# Patient Record
Sex: FEMALE | Race: WHITE | NOT HISPANIC OR LATINO | Employment: FULL TIME | URBAN - METROPOLITAN AREA
[De-identification: names, ages, dates, MRNs, and addresses within clinical notes are randomized per-mention and may not be internally consistent; named-entity substitution may affect disease eponyms.]

---

## 2023-03-24 LAB
EXTERNAL HEPATITIS B SURFACE ANTIGEN: NEGATIVE
EXTERNAL HEPATITIS C AB: NONREACTIVE
EXTERNAL RUBELLA QUALITATIVE: NORMAL
EXTERNAL SYPHILIS RPR SCREEN: NORMAL
HIV1 P24 AG SERPL QL IA: NEGATIVE

## 2023-10-01 ENCOUNTER — HOSPITAL ENCOUNTER (EMERGENCY)
Facility: HOSPITAL | Age: 28
Discharge: HOME | End: 2023-10-01
Attending: OBSTETRICS & GYNECOLOGY | Admitting: OBSTETRICS & GYNECOLOGY
Payer: COMMERCIAL

## 2023-10-01 VITALS
RESPIRATION RATE: 16 BRPM | HEART RATE: 74 BPM | DIASTOLIC BLOOD PRESSURE: 88 MMHG | TEMPERATURE: 98.2 F | OXYGEN SATURATION: 99 % | WEIGHT: 255 LBS | SYSTOLIC BLOOD PRESSURE: 125 MMHG | BODY MASS INDEX: 39.94 KG/M2

## 2023-10-01 LAB
ABO GROUP BLD: NORMAL
ALBUMIN SERPL-MCNC: 3.3 G/DL (ref 3.5–5.2)
ALBUMIN/GLOB SERPL: 1.1 G/DL
ALP SERPL-CCNC: 155 U/L (ref 39–117)
ALT SERPL W P-5'-P-CCNC: 25 U/L (ref 1–33)
ANION GAP SERPL CALCULATED.3IONS-SCNC: 11 MMOL/L (ref 5–15)
AST SERPL-CCNC: 33 U/L (ref 1–32)
BACTERIA UR QL AUTO: ABNORMAL /HPF
BASOPHILS # BLD AUTO: 0.04 10*3/MM3 (ref 0–0.2)
BASOPHILS NFR BLD AUTO: 0.4 % (ref 0–1.5)
BILIRUB SERPL-MCNC: 0.5 MG/DL (ref 0–1.2)
BILIRUB UR QL STRIP: NEGATIVE
BLD GP AB SCN SERPL QL: NEGATIVE
BUN SERPL-MCNC: 8 MG/DL (ref 6–20)
BUN/CREAT SERPL: 12.3 (ref 7–25)
CALCIUM SPEC-SCNC: 9.1 MG/DL (ref 8.6–10.5)
CHLORIDE SERPL-SCNC: 106 MMOL/L (ref 98–107)
CLARITY UR: CLEAR
CO2 SERPL-SCNC: 19 MMOL/L (ref 22–29)
COLOR UR: YELLOW
CREAT SERPL-MCNC: 0.65 MG/DL (ref 0.57–1)
CREAT UR-MCNC: 177 MG/DL
DEPRECATED RDW RBC AUTO: 39.2 FL (ref 37–54)
EGFRCR SERPLBLD CKD-EPI 2021: 121.6 ML/MIN/1.73
EOSINOPHIL # BLD AUTO: 0.1 10*3/MM3 (ref 0–0.4)
EOSINOPHIL NFR BLD AUTO: 1 % (ref 0.3–6.2)
ERYTHROCYTE [DISTWIDTH] IN BLOOD BY AUTOMATED COUNT: 13.1 % (ref 12.3–15.4)
GLOBULIN UR ELPH-MCNC: 2.9 GM/DL
GLUCOSE SERPL-MCNC: 98 MG/DL (ref 65–99)
GLUCOSE UR STRIP-MCNC: NEGATIVE MG/DL
HCT VFR BLD AUTO: 34.5 % (ref 34–46.6)
HGB BLD-MCNC: 11.6 G/DL (ref 12–15.9)
HGB UR QL STRIP.AUTO: NEGATIVE
HYALINE CASTS UR QL AUTO: ABNORMAL /LPF
IMM GRANULOCYTES # BLD AUTO: 0.02 10*3/MM3 (ref 0–0.05)
IMM GRANULOCYTES NFR BLD AUTO: 0.2 % (ref 0–0.5)
KETONES UR QL STRIP: NEGATIVE
LEUKOCYTE ESTERASE UR QL STRIP.AUTO: ABNORMAL
LYMPHOCYTES # BLD AUTO: 2.3 10*3/MM3 (ref 0.7–3.1)
LYMPHOCYTES NFR BLD AUTO: 23.6 % (ref 19.6–45.3)
MCH RBC QN AUTO: 28 PG (ref 26.6–33)
MCHC RBC AUTO-ENTMCNC: 33.6 G/DL (ref 31.5–35.7)
MCV RBC AUTO: 83.1 FL (ref 79–97)
MONOCYTES # BLD AUTO: 1.02 10*3/MM3 (ref 0.1–0.9)
MONOCYTES NFR BLD AUTO: 10.5 % (ref 5–12)
NEUTROPHILS NFR BLD AUTO: 6.26 10*3/MM3 (ref 1.7–7)
NEUTROPHILS NFR BLD AUTO: 64.3 % (ref 42.7–76)
NITRITE UR QL STRIP: NEGATIVE
NRBC BLD AUTO-RTO: 0 /100 WBC (ref 0–0.2)
PH UR STRIP.AUTO: 5.5 [PH] (ref 5–8)
PLATELET # BLD AUTO: 294 10*3/MM3 (ref 140–450)
PMV BLD AUTO: 11.4 FL (ref 6–12)
POTASSIUM SERPL-SCNC: 4 MMOL/L (ref 3.5–5.2)
PROT ?TM UR-MCNC: 16.6 MG/DL
PROT SERPL-MCNC: 6.2 G/DL (ref 6–8.5)
PROT UR QL STRIP: NEGATIVE
PROT/CREAT UR: 93.8 MG/G CREA (ref 0–200)
RBC # BLD AUTO: 4.15 10*6/MM3 (ref 3.77–5.28)
RBC # UR STRIP: ABNORMAL /HPF
REF LAB TEST METHOD: ABNORMAL
RH BLD: POSITIVE
SODIUM SERPL-SCNC: 136 MMOL/L (ref 136–145)
SP GR UR STRIP: 1.02 (ref 1–1.03)
SQUAMOUS #/AREA URNS HPF: ABNORMAL /HPF
T&S EXPIRATION DATE: NORMAL
UROBILINOGEN UR QL STRIP: ABNORMAL
WBC # UR STRIP: ABNORMAL /HPF
WBC NRBC COR # BLD: 9.74 10*3/MM3 (ref 3.4–10.8)

## 2023-10-01 PROCEDURE — 82570 ASSAY OF URINE CREATININE: CPT | Performed by: OBSTETRICS & GYNECOLOGY

## 2023-10-01 PROCEDURE — 86901 BLOOD TYPING SEROLOGIC RH(D): CPT | Performed by: OBSTETRICS & GYNECOLOGY

## 2023-10-01 PROCEDURE — 80053 COMPREHEN METABOLIC PANEL: CPT | Performed by: OBSTETRICS & GYNECOLOGY

## 2023-10-01 PROCEDURE — 87086 URINE CULTURE/COLONY COUNT: CPT | Performed by: OBSTETRICS & GYNECOLOGY

## 2023-10-01 PROCEDURE — 86850 RBC ANTIBODY SCREEN: CPT | Performed by: OBSTETRICS & GYNECOLOGY

## 2023-10-01 PROCEDURE — 59025 FETAL NON-STRESS TEST: CPT

## 2023-10-01 PROCEDURE — 84156 ASSAY OF PROTEIN URINE: CPT | Performed by: OBSTETRICS & GYNECOLOGY

## 2023-10-01 PROCEDURE — 81001 URINALYSIS AUTO W/SCOPE: CPT | Performed by: OBSTETRICS & GYNECOLOGY

## 2023-10-01 PROCEDURE — 85025 COMPLETE CBC W/AUTO DIFF WBC: CPT | Performed by: OBSTETRICS & GYNECOLOGY

## 2023-10-01 PROCEDURE — 99283 EMERGENCY DEPT VISIT LOW MDM: CPT | Performed by: OBSTETRICS & GYNECOLOGY

## 2023-10-01 PROCEDURE — 86900 BLOOD TYPING SEROLOGIC ABO: CPT | Performed by: OBSTETRICS & GYNECOLOGY

## 2023-10-01 RX ORDER — PRENATAL VIT NO.126/IRON/FOLIC 28MG-0.8MG
TABLET ORAL DAILY
COMMUNITY

## 2023-10-01 NOTE — DISCHARGE INSTRUCTIONS
Continue to monitor your BP at home.    Return to L&D for frequent painful contractions, leaking fluid, vaginal bleeding, decreased fetal movement, severe headache, vision changes, abdominal pain, significant swelling    Bedrest    F/u Monday or Tuesday with Dr Ramírez

## 2023-10-01 NOTE — OBED NOTES
GENIE Note OB    Patient Name: Berna Knight  YOB: 1995  MRN: [id number]  Admission Date: 10/1/2023 12:05 PM  Date of Service: 10/1/2023    Chief Complaint:Pt reports BP high        Subjective     Berna Knight is a 28 y.o. female  at Unknown with Estimated Date of Delivery: None noted. who presents with the chief complaint listed above. Pt reports her BP usually 120/80s. 3 days ago in OB office her BP was 140/101. She has been checking her BP thru out pregnancy as she has a history of preeclampsia necessitating delivery at 36 weeks. Her highest BP she got at home was 166/108. She denies HA, visual changes, or RUQ pain. No unusual swelling     She sees Evan Ramírez MD for her prenatal care. Her pregnancy has been complicated by:  hx of prior CS, preeclampsia, obese    She describes fetal movement as normal.  She denies rupture of membranes.  She denies vaginal bleeding. She is not feeling contractions.        Objective   There are no problems to display for this patient.       OB History    Para Term  AB Living   1 0 0 0 0 0   SAB IAB Ectopic Molar Multiple Live Births   0 0 0 0 0 0      # Outcome Date GA Lbr Xiang/2nd Weight Sex Delivery Anes PTL Lv   1 Current                 Past Medical History:   Diagnosis Date    Asthma        Past Surgical History:   Procedure Laterality Date     SECTION      TONSILLECTOMY      WISDOM TOOTH EXTRACTION         No current facility-administered medications on file prior to encounter.     Current Outpatient Medications on File Prior to Encounter   Medication Sig Dispense Refill    levonorgestrel (MIRENA) 20 MCG/24HR IUD 1 each by Intrauterine route.      terbinafine (LAMISIL AT) 1 % cream Apply  topically 2 (Two) Times a Day. 15 g 1       Allergies   Allergen Reactions    Augmentin [Amoxicillin-Pot Clavulanate]     Cephalosporins     Latex     Penicillins        Family History   Problem Relation Age of Onset    Diabetes  Mother         type 2 controlled by diet       Social History     Socioeconomic History    Marital status: Single   Tobacco Use    Smoking status: Never    Smokeless tobacco: Never   Substance and Sexual Activity    Alcohol use: No    Drug use: No    Sexual activity: Defer           Review of Systems   Constitutional:  Negative for chills and fever.   HENT: Negative.     Eyes:  Negative for photophobia and visual disturbance.   Respiratory:  Negative for shortness of breath.    Cardiovascular:  Negative for chest pain.   Gastrointestinal:  Negative for nausea.   Psychiatric/Behavioral:  The patient is not nervous/anxious.         PHYSICAL EXAM:      VITAL SIGNS:  There were no vitals filed for this visit.         FHT'S:    150 with moderate variability and accels                                     PHYSICAL EXAM:      General: well developed; well nourished  no acute distress   Heart: Not performed.   Lungs   breathing is unlabored   Abdomen: Gravid and non tender     Extremities: trace edema, DTRs 1 plus, no clonus       Cervix: was not checked.        Contractions:   none                    LABS AND TESTING ORDERED:  Uterine and fetal monitoring  Urinalysis  CBC, CMP, urine P:C    LAB RESULTS:    No results found for this or any previous visit (from the past 24 hour(s)).    No results found for: ABO, RH    No results found for: STREPGPB                    Impression:   @36 week, 2/7 days  Final Diagnosis: GHTN, RO preeclampsia, BP mildly elevated , labs normal including urine P:C of 93    Plan:  1. Discharge home  2. Plan of care has been reviewed with patient along with risks, benefits of treatment.   All questions have been answered. Call health care provider for any further concerns and keep office appointments.  3. Bed rest with tox precautions, keep appt for early this week, labor precautions      I discussed the patients findings and my recommendations with patient, family, and nursing staff      Eloina BRICEÑO  MD Krzysztof  10/1/2023  12:35 EDT

## 2023-10-01 NOTE — NURSING NOTE
Discharge instructions reviewed with patient. Encouraged bedrest. Instructed patient to return to L&D for symptoms of labor or pre-eclampsia.  Questions answered. DC home.

## 2023-10-02 LAB — BACTERIA SPEC AEROBE CULT: NO GROWTH

## 2023-10-03 LAB — EXTERNAL GROUP B STREP ANTIGEN: NORMAL

## 2023-10-12 ENCOUNTER — ANESTHESIA (OUTPATIENT)
Dept: LABOR AND DELIVERY | Facility: HOSPITAL | Age: 28
End: 2023-10-12
Payer: COMMERCIAL

## 2023-10-12 ENCOUNTER — HOSPITAL ENCOUNTER (INPATIENT)
Facility: HOSPITAL | Age: 28
LOS: 3 days | Discharge: HOME | End: 2023-10-15
Attending: OBSTETRICS & GYNECOLOGY | Admitting: OBSTETRICS & GYNECOLOGY
Payer: COMMERCIAL

## 2023-10-12 ENCOUNTER — ANESTHESIA EVENT (OUTPATIENT)
Dept: LABOR AND DELIVERY | Facility: HOSPITAL | Age: 28
End: 2023-10-12
Payer: COMMERCIAL

## 2023-10-12 PROBLEM — O13.3 PREGNANCY-INDUCED HYPERTENSION, THIRD TRIMESTER: Status: ACTIVE | Noted: 2023-10-12

## 2023-10-12 PROBLEM — O34.219 HISTORY OF CESAREAN SECTION COMPLICATING PREGNANCY: Status: ACTIVE | Noted: 2023-10-12

## 2023-10-12 PROBLEM — Z34.90 PREGNANCY: Status: ACTIVE | Noted: 2023-10-12

## 2023-10-12 LAB
ABO GROUP BLD: NORMAL
ALBUMIN SERPL-MCNC: 3.2 G/DL (ref 3.5–5.2)
ALBUMIN/GLOB SERPL: 1 G/DL
ALP SERPL-CCNC: 164 U/L (ref 39–117)
ALT SERPL W P-5'-P-CCNC: 40 U/L (ref 1–33)
ANION GAP SERPL CALCULATED.3IONS-SCNC: 12.7 MMOL/L (ref 5–15)
AST SERPL-CCNC: 25 U/L (ref 1–32)
ATMOSPHERIC PRESS: 745.1 MMHG
BASE EXCESS BLDCOA CALC-SCNC: -4.4 MMOL/L (ref -2–2)
BASOPHILS # BLD AUTO: 0.03 10*3/MM3 (ref 0–0.2)
BASOPHILS NFR BLD AUTO: 0.3 % (ref 0–1.5)
BDY SITE: ABNORMAL
BILIRUB SERPL-MCNC: 0.4 MG/DL (ref 0–1.2)
BLD GP AB SCN SERPL QL: NEGATIVE
BUN SERPL-MCNC: 8 MG/DL (ref 6–20)
BUN/CREAT SERPL: 10.7 (ref 7–25)
CALCIUM SPEC-SCNC: 8.9 MG/DL (ref 8.6–10.5)
CHLORIDE SERPL-SCNC: 104 MMOL/L (ref 98–107)
CO2 BLDA-SCNC: 26.8 MMOL/L (ref 23–27)
CO2 SERPL-SCNC: 18.3 MMOL/L (ref 22–29)
CREAT SERPL-MCNC: 0.75 MG/DL (ref 0.57–1)
DEPRECATED RDW RBC AUTO: 41.7 FL (ref 37–54)
DEVICE COMMENT: ABNORMAL
EGFRCR SERPLBLD CKD-EPI 2021: 110 ML/MIN/1.73
EOSINOPHIL # BLD AUTO: 0.13 10*3/MM3 (ref 0–0.4)
EOSINOPHIL NFR BLD AUTO: 1.3 % (ref 0.3–6.2)
ERYTHROCYTE [DISTWIDTH] IN BLOOD BY AUTOMATED COUNT: 13.5 % (ref 12.3–15.4)
GLOBULIN UR ELPH-MCNC: 3.3 GM/DL
GLUCOSE SERPL-MCNC: 115 MG/DL (ref 65–99)
HCO3 BLDCOA-SCNC: 25 MMOL/L (ref 22–28)
HCT VFR BLD AUTO: 36.9 % (ref 34–46.6)
HGB BLD-MCNC: 12.3 G/DL (ref 12–15.9)
IMM GRANULOCYTES # BLD AUTO: 0.03 10*3/MM3 (ref 0–0.05)
IMM GRANULOCYTES NFR BLD AUTO: 0.3 % (ref 0–0.5)
LYMPHOCYTES # BLD AUTO: 3.05 10*3/MM3 (ref 0.7–3.1)
LYMPHOCYTES NFR BLD AUTO: 30.1 % (ref 19.6–45.3)
MCH RBC QN AUTO: 28.3 PG (ref 26.6–33)
MCHC RBC AUTO-ENTMCNC: 33.3 G/DL (ref 31.5–35.7)
MCV RBC AUTO: 84.8 FL (ref 79–97)
MODALITY: ABNORMAL
MONOCYTES # BLD AUTO: 0.68 10*3/MM3 (ref 0.1–0.9)
MONOCYTES NFR BLD AUTO: 6.7 % (ref 5–12)
NEUTROPHILS NFR BLD AUTO: 6.22 10*3/MM3 (ref 1.7–7)
NEUTROPHILS NFR BLD AUTO: 61.3 % (ref 42.7–76)
NRBC BLD AUTO-RTO: 0 /100 WBC (ref 0–0.2)
PCO2 BLDCOA: 60.4 MMHG (ref 43–63)
PH BLDCOA: 7.22 PH UNITS (ref 7.18–7.34)
PLATELET # BLD AUTO: 312 10*3/MM3 (ref 140–450)
PMV BLD AUTO: 12.2 FL (ref 6–12)
PO2 BLDCOA: <18.1 MMHG (ref 12–26)
POTASSIUM SERPL-SCNC: 4 MMOL/L (ref 3.5–5.2)
PROT SERPL-MCNC: 6.5 G/DL (ref 6–8.5)
RBC # BLD AUTO: 4.35 10*6/MM3 (ref 3.77–5.28)
RH BLD: POSITIVE
SAO2 % BLDCOA: 18.9 %
SODIUM SERPL-SCNC: 135 MMOL/L (ref 136–145)
T&S EXPIRATION DATE: NORMAL
WBC NRBC COR # BLD: 10.14 10*3/MM3 (ref 3.4–10.8)

## 2023-10-12 PROCEDURE — 25810000003 LACTATED RINGERS PER 1000 ML: Performed by: OBSTETRICS & GYNECOLOGY

## 2023-10-12 PROCEDURE — 25010000002 ONDANSETRON PER 1 MG: Performed by: OBSTETRICS & GYNECOLOGY

## 2023-10-12 PROCEDURE — 25010000002 KETOROLAC TROMETHAMINE PER 15 MG: Performed by: OBSTETRICS & GYNECOLOGY

## 2023-10-12 PROCEDURE — 88307 TISSUE EXAM BY PATHOLOGIST: CPT

## 2023-10-12 PROCEDURE — 86901 BLOOD TYPING SEROLOGIC RH(D): CPT | Performed by: OBSTETRICS & GYNECOLOGY

## 2023-10-12 PROCEDURE — 86900 BLOOD TYPING SEROLOGIC ABO: CPT | Performed by: OBSTETRICS & GYNECOLOGY

## 2023-10-12 PROCEDURE — 25010000002 KETOROLAC TROMETHAMINE PER 15 MG: Performed by: NURSE ANESTHETIST, CERTIFIED REGISTERED

## 2023-10-12 PROCEDURE — 25010000002 DROPERIDOL PER 5 MG: Performed by: OBSTETRICS & GYNECOLOGY

## 2023-10-12 PROCEDURE — 25010000002 BUPIVACAINE PF 0.75 % SOLUTION: Performed by: ANESTHESIOLOGY

## 2023-10-12 PROCEDURE — 82803 BLOOD GASES ANY COMBINATION: CPT

## 2023-10-12 PROCEDURE — 25810000003 LACTATED RINGERS SOLUTION: Performed by: OBSTETRICS & GYNECOLOGY

## 2023-10-12 PROCEDURE — 25010000002 EPINEPHRINE 1 MG/ML SOLUTION 30 ML VIAL: Performed by: OBSTETRICS & GYNECOLOGY

## 2023-10-12 PROCEDURE — 25010000002 CLONIDINE PER 1 MG: Performed by: OBSTETRICS & GYNECOLOGY

## 2023-10-12 PROCEDURE — 86850 RBC ANTIBODY SCREEN: CPT | Performed by: OBSTETRICS & GYNECOLOGY

## 2023-10-12 PROCEDURE — 85025 COMPLETE CBC W/AUTO DIFF WBC: CPT | Performed by: OBSTETRICS & GYNECOLOGY

## 2023-10-12 PROCEDURE — 80053 COMPREHEN METABOLIC PANEL: CPT | Performed by: OBSTETRICS & GYNECOLOGY

## 2023-10-12 PROCEDURE — 25010000002 PHENYLEPHRINE 10 MG/ML SOLUTION: Performed by: NURSE ANESTHETIST, CERTIFIED REGISTERED

## 2023-10-12 PROCEDURE — 25010000002 CLINDAMYCIN 900 MG/50ML SOLUTION: Performed by: OBSTETRICS & GYNECOLOGY

## 2023-10-12 PROCEDURE — 25010000002 MORPHINE PER 10 MG: Performed by: ANESTHESIOLOGY

## 2023-10-12 PROCEDURE — 25010000002 ROPIVACAINE PER 1 MG: Performed by: OBSTETRICS & GYNECOLOGY

## 2023-10-12 PROCEDURE — 25010000002 GENTAMICIN PER 80 MG: Performed by: OBSTETRICS & GYNECOLOGY

## 2023-10-12 RX ORDER — PHYTONADIONE 1 MG/.5ML
INJECTION, EMULSION INTRAMUSCULAR; INTRAVENOUS; SUBCUTANEOUS
Status: ACTIVE
Start: 2023-10-12 | End: 2023-10-13

## 2023-10-12 RX ORDER — ENOXAPARIN SODIUM 100 MG/ML
40 INJECTION SUBCUTANEOUS EVERY 12 HOURS
Status: DISCONTINUED | OUTPATIENT
Start: 2023-10-13 | End: 2023-10-15 | Stop reason: HOSPADM

## 2023-10-12 RX ORDER — OXYTOCIN/0.9 % SODIUM CHLORIDE 30/500 ML
125 PLASTIC BAG, INJECTION (ML) INTRAVENOUS CONTINUOUS PRN
Status: DISCONTINUED | OUTPATIENT
Start: 2023-10-12 | End: 2023-10-15 | Stop reason: HOSPADM

## 2023-10-12 RX ORDER — KETOROLAC TROMETHAMINE 30 MG/ML
30 INJECTION, SOLUTION INTRAMUSCULAR; INTRAVENOUS ONCE
Status: DISCONTINUED | OUTPATIENT
Start: 2023-10-12 | End: 2023-10-12

## 2023-10-12 RX ORDER — MISOPROSTOL 200 UG/1
800 TABLET ORAL ONCE AS NEEDED
Status: DISCONTINUED | OUTPATIENT
Start: 2023-10-12 | End: 2023-10-12

## 2023-10-12 RX ORDER — HYDROXYZINE 50 MG/1
50 TABLET, FILM COATED ORAL EVERY 6 HOURS PRN
Status: DISCONTINUED | OUTPATIENT
Start: 2023-10-12 | End: 2023-10-15 | Stop reason: HOSPADM

## 2023-10-12 RX ORDER — MORPHINE SULFATE 2 MG/ML
2 INJECTION, SOLUTION INTRAMUSCULAR; INTRAVENOUS
Status: ACTIVE | OUTPATIENT
Start: 2023-10-12 | End: 2023-10-12

## 2023-10-12 RX ORDER — KETOROLAC TROMETHAMINE 30 MG/ML
INJECTION, SOLUTION INTRAMUSCULAR; INTRAVENOUS AS NEEDED
Status: DISCONTINUED | OUTPATIENT
Start: 2023-10-12 | End: 2023-10-12 | Stop reason: SURG

## 2023-10-12 RX ORDER — DROPERIDOL 2.5 MG/ML
0.62 INJECTION, SOLUTION INTRAMUSCULAR; INTRAVENOUS
Status: COMPLETED | OUTPATIENT
Start: 2023-10-12 | End: 2023-10-12

## 2023-10-12 RX ORDER — OXYCODONE HYDROCHLORIDE 10 MG/1
10 TABLET ORAL EVERY 4 HOURS PRN
Status: DISCONTINUED | OUTPATIENT
Start: 2023-10-12 | End: 2023-10-15 | Stop reason: HOSPADM

## 2023-10-12 RX ORDER — SCOLOPAMINE TRANSDERMAL SYSTEM 1 MG/1
1 PATCH, EXTENDED RELEASE TRANSDERMAL
Status: DISCONTINUED | OUTPATIENT
Start: 2023-10-12 | End: 2023-10-15 | Stop reason: HOSPADM

## 2023-10-12 RX ORDER — DOCUSATE SODIUM 100 MG/1
100 CAPSULE, LIQUID FILLED ORAL 2 TIMES DAILY PRN
Status: DISCONTINUED | OUTPATIENT
Start: 2023-10-12 | End: 2023-10-15 | Stop reason: HOSPADM

## 2023-10-12 RX ORDER — SIMETHICONE 80 MG
80 TABLET,CHEWABLE ORAL 4 TIMES DAILY PRN
Status: DISCONTINUED | OUTPATIENT
Start: 2023-10-12 | End: 2023-10-15 | Stop reason: HOSPADM

## 2023-10-12 RX ORDER — PROMETHAZINE HYDROCHLORIDE 25 MG/1
25 TABLET ORAL EVERY 6 HOURS PRN
Status: DISCONTINUED | OUTPATIENT
Start: 2023-10-12 | End: 2023-10-15 | Stop reason: HOSPADM

## 2023-10-12 RX ORDER — OXYTOCIN/0.9 % SODIUM CHLORIDE 30/500 ML
999 PLASTIC BAG, INJECTION (ML) INTRAVENOUS ONCE
Status: COMPLETED | OUTPATIENT
Start: 2023-10-12 | End: 2023-10-12

## 2023-10-12 RX ORDER — HYDROCORTISONE 25 MG/G
CREAM TOPICAL 3 TIMES DAILY PRN
Status: DISCONTINUED | OUTPATIENT
Start: 2023-10-12 | End: 2023-10-15 | Stop reason: HOSPADM

## 2023-10-12 RX ORDER — OXYCODONE HYDROCHLORIDE 5 MG/1
5 TABLET ORAL EVERY 4 HOURS PRN
Status: DISCONTINUED | OUTPATIENT
Start: 2023-10-12 | End: 2023-10-15 | Stop reason: HOSPADM

## 2023-10-12 RX ORDER — TRANEXAMIC ACID 10 MG/ML
1000 INJECTION, SOLUTION INTRAVENOUS ONCE AS NEEDED
Status: DISCONTINUED | OUTPATIENT
Start: 2023-10-12 | End: 2023-10-12

## 2023-10-12 RX ORDER — SODIUM CHLORIDE, SODIUM LACTATE, POTASSIUM CHLORIDE, CALCIUM CHLORIDE 600; 310; 30; 20 MG/100ML; MG/100ML; MG/100ML; MG/100ML
125 INJECTION, SOLUTION INTRAVENOUS CONTINUOUS
Status: DISCONTINUED | OUTPATIENT
Start: 2023-10-12 | End: 2023-10-12

## 2023-10-12 RX ORDER — SODIUM CHLORIDE 9 MG/ML
40 INJECTION, SOLUTION INTRAVENOUS AS NEEDED
Status: DISCONTINUED | OUTPATIENT
Start: 2023-10-12 | End: 2023-10-15 | Stop reason: HOSPADM

## 2023-10-12 RX ORDER — FAMOTIDINE 10 MG/ML
20 INJECTION, SOLUTION INTRAVENOUS ONCE AS NEEDED
Status: COMPLETED | OUTPATIENT
Start: 2023-10-12 | End: 2023-10-12

## 2023-10-12 RX ORDER — ACETAMINOPHEN 500 MG
1000 TABLET ORAL EVERY 6 HOURS
Status: COMPLETED | OUTPATIENT
Start: 2023-10-12 | End: 2023-10-13

## 2023-10-12 RX ORDER — ONDANSETRON 4 MG/1
4 TABLET, FILM COATED ORAL EVERY 8 HOURS PRN
Status: DISCONTINUED | OUTPATIENT
Start: 2023-10-12 | End: 2023-10-15 | Stop reason: HOSPADM

## 2023-10-12 RX ORDER — ACETAMINOPHEN 325 MG/1
650 TABLET ORAL EVERY 6 HOURS
Status: DISCONTINUED | OUTPATIENT
Start: 2023-10-13 | End: 2023-10-15 | Stop reason: HOSPADM

## 2023-10-12 RX ORDER — PHENYLEPHRINE HYDROCHLORIDE 10 MG/ML
INJECTION INTRAVENOUS AS NEEDED
Status: DISCONTINUED | OUTPATIENT
Start: 2023-10-12 | End: 2023-10-12 | Stop reason: SURG

## 2023-10-12 RX ORDER — OXYTOCIN/0.9 % SODIUM CHLORIDE 30/500 ML
250 PLASTIC BAG, INJECTION (ML) INTRAVENOUS CONTINUOUS
Status: DISPENSED | OUTPATIENT
Start: 2023-10-12 | End: 2023-10-12

## 2023-10-12 RX ORDER — ERYTHROMYCIN 5 MG/G
OINTMENT OPHTHALMIC
Status: ACTIVE
Start: 2023-10-12 | End: 2023-10-13

## 2023-10-12 RX ORDER — PRENATAL VIT/IRON FUM/FOLIC AC 27MG-0.8MG
1 TABLET ORAL DAILY
Status: DISCONTINUED | OUTPATIENT
Start: 2023-10-12 | End: 2023-10-15 | Stop reason: HOSPADM

## 2023-10-12 RX ORDER — DIPHENHYDRAMINE HYDROCHLORIDE 50 MG/ML
25 INJECTION INTRAMUSCULAR; INTRAVENOUS EVERY 4 HOURS PRN
Status: DISCONTINUED | OUTPATIENT
Start: 2023-10-12 | End: 2023-10-15 | Stop reason: HOSPADM

## 2023-10-12 RX ORDER — HYDROMORPHONE HYDROCHLORIDE 1 MG/ML
0.5 INJECTION, SOLUTION INTRAMUSCULAR; INTRAVENOUS; SUBCUTANEOUS
Status: DISCONTINUED | OUTPATIENT
Start: 2023-10-12 | End: 2023-10-12 | Stop reason: HOSPADM

## 2023-10-12 RX ORDER — NALOXONE HCL 0.4 MG/ML
0.2 VIAL (ML) INJECTION
Status: DISCONTINUED | OUTPATIENT
Start: 2023-10-12 | End: 2023-10-15 | Stop reason: HOSPADM

## 2023-10-12 RX ORDER — ONDANSETRON 2 MG/ML
4 INJECTION INTRAMUSCULAR; INTRAVENOUS EVERY 6 HOURS PRN
Status: DISCONTINUED | OUTPATIENT
Start: 2023-10-12 | End: 2023-10-12

## 2023-10-12 RX ORDER — ACETAMINOPHEN 500 MG
1000 TABLET ORAL ONCE
Status: COMPLETED | OUTPATIENT
Start: 2023-10-12 | End: 2023-10-12

## 2023-10-12 RX ORDER — BUPIVACAINE HYDROCHLORIDE 7.5 MG/ML
INJECTION, SOLUTION EPIDURAL; RETROBULBAR
Status: COMPLETED | OUTPATIENT
Start: 2023-10-12 | End: 2023-10-12

## 2023-10-12 RX ORDER — CITRIC ACID/SODIUM CITRATE 334-500MG
30 SOLUTION, ORAL ORAL ONCE
Status: COMPLETED | OUTPATIENT
Start: 2023-10-12 | End: 2023-10-12

## 2023-10-12 RX ORDER — KETOROLAC TROMETHAMINE 15 MG/ML
15 INJECTION, SOLUTION INTRAMUSCULAR; INTRAVENOUS EVERY 6 HOURS
Status: COMPLETED | OUTPATIENT
Start: 2023-10-12 | End: 2023-10-13

## 2023-10-12 RX ORDER — IBUPROFEN 600 MG/1
600 TABLET ORAL EVERY 6 HOURS
Status: DISCONTINUED | OUTPATIENT
Start: 2023-10-13 | End: 2023-10-15 | Stop reason: HOSPADM

## 2023-10-12 RX ORDER — SODIUM CHLORIDE 0.9 % (FLUSH) 0.9 %
3-10 SYRINGE (ML) INJECTION AS NEEDED
Status: DISCONTINUED | OUTPATIENT
Start: 2023-10-12 | End: 2023-10-15 | Stop reason: HOSPADM

## 2023-10-12 RX ORDER — SODIUM CHLORIDE 0.9 % (FLUSH) 0.9 %
10 SYRINGE (ML) INJECTION AS NEEDED
Status: DISCONTINUED | OUTPATIENT
Start: 2023-10-12 | End: 2023-10-12

## 2023-10-12 RX ORDER — MORPHINE SULFATE 4 MG/ML
INJECTION, SOLUTION INTRAMUSCULAR; INTRAVENOUS
Status: COMPLETED | OUTPATIENT
Start: 2023-10-12 | End: 2023-10-12

## 2023-10-12 RX ORDER — ONDANSETRON 2 MG/ML
4 INJECTION INTRAMUSCULAR; INTRAVENOUS EVERY 8 HOURS PRN
Status: DISCONTINUED | OUTPATIENT
Start: 2023-10-12 | End: 2023-10-15 | Stop reason: HOSPADM

## 2023-10-12 RX ORDER — ONDANSETRON 2 MG/ML
4 INJECTION INTRAMUSCULAR; INTRAVENOUS ONCE AS NEEDED
Status: COMPLETED | OUTPATIENT
Start: 2023-10-12 | End: 2023-10-12

## 2023-10-12 RX ORDER — SODIUM CHLORIDE 0.9 % (FLUSH) 0.9 %
3 SYRINGE (ML) INJECTION EVERY 12 HOURS SCHEDULED
Status: DISCONTINUED | OUTPATIENT
Start: 2023-10-12 | End: 2023-10-15 | Stop reason: HOSPADM

## 2023-10-12 RX ORDER — DIPHENHYDRAMINE HCL 25 MG
25 CAPSULE ORAL EVERY 4 HOURS PRN
Status: DISCONTINUED | OUTPATIENT
Start: 2023-10-12 | End: 2023-10-15 | Stop reason: HOSPADM

## 2023-10-12 RX ORDER — METHYLERGONOVINE MALEATE 0.2 MG/ML
200 INJECTION INTRAVENOUS ONCE AS NEEDED
Status: DISCONTINUED | OUTPATIENT
Start: 2023-10-12 | End: 2023-10-12

## 2023-10-12 RX ORDER — OXYTOCIN/0.9 % SODIUM CHLORIDE 30/500 ML
125 PLASTIC BAG, INJECTION (ML) INTRAVENOUS CONTINUOUS PRN
Status: COMPLETED | OUTPATIENT
Start: 2023-10-12 | End: 2023-10-12

## 2023-10-12 RX ORDER — CLINDAMYCIN PHOSPHATE 900 MG/50ML
900 INJECTION INTRAVENOUS ONCE
Status: COMPLETED | OUTPATIENT
Start: 2023-10-12 | End: 2023-10-12

## 2023-10-12 RX ORDER — PROMETHAZINE HYDROCHLORIDE 12.5 MG/1
12.5 SUPPOSITORY RECTAL EVERY 6 HOURS PRN
Status: DISCONTINUED | OUTPATIENT
Start: 2023-10-12 | End: 2023-10-15 | Stop reason: HOSPADM

## 2023-10-12 RX ORDER — CARBOPROST TROMETHAMINE 250 UG/ML
250 INJECTION, SOLUTION INTRAMUSCULAR
Status: DISCONTINUED | OUTPATIENT
Start: 2023-10-12 | End: 2023-10-12

## 2023-10-12 RX ADMIN — ONDANSETRON 4 MG: 2 INJECTION INTRAMUSCULAR; INTRAVENOUS at 17:33

## 2023-10-12 RX ADMIN — SODIUM CHLORIDE, POTASSIUM CHLORIDE, SODIUM LACTATE AND CALCIUM CHLORIDE: 600; 310; 30; 20 INJECTION, SOLUTION INTRAVENOUS at 13:56

## 2023-10-12 RX ADMIN — SCOPALAMINE 1 PATCH: 1 PATCH, EXTENDED RELEASE TRANSDERMAL at 18:04

## 2023-10-12 RX ADMIN — ACETAMINOPHEN 1000 MG: 500 TABLET ORAL at 20:15

## 2023-10-12 RX ADMIN — GENTAMICIN SULFATE 420 MG: 40 INJECTION, SOLUTION INTRAMUSCULAR; INTRAVENOUS at 13:31

## 2023-10-12 RX ADMIN — Medication 999 ML/HR: at 13:42

## 2023-10-12 RX ADMIN — ACETAMINOPHEN 1000 MG: 500 TABLET ORAL at 13:03

## 2023-10-12 RX ADMIN — KETOROLAC TROMETHAMINE 30 MG: 30 INJECTION, SOLUTION INTRAMUSCULAR at 14:08

## 2023-10-12 RX ADMIN — DROPERIDOL 0.62 MG: 2.5 INJECTION, SOLUTION INTRAMUSCULAR; INTRAVENOUS at 17:13

## 2023-10-12 RX ADMIN — Medication 125 ML/HR: at 15:16

## 2023-10-12 RX ADMIN — FAMOTIDINE 20 MG: 10 INJECTION INTRAVENOUS at 13:02

## 2023-10-12 RX ADMIN — BUPIVACAINE HYDROCHLORIDE 2 ML: 7.5 INJECTION, SOLUTION EPIDURAL; RETROBULBAR at 13:18

## 2023-10-12 RX ADMIN — MORPHINE SULFATE 200 MCG: 4 INJECTION, SOLUTION INTRAMUSCULAR; INTRAVENOUS at 13:18

## 2023-10-12 RX ADMIN — ONDANSETRON 4 MG: 2 INJECTION INTRAMUSCULAR; INTRAVENOUS at 13:03

## 2023-10-12 RX ADMIN — KETOROLAC TROMETHAMINE 15 MG: 15 INJECTION, SOLUTION INTRAMUSCULAR; INTRAVENOUS at 23:04

## 2023-10-12 RX ADMIN — CLINDAMYCIN PHOSPHATE 900 MG: 900 INJECTION, SOLUTION INTRAVENOUS at 12:49

## 2023-10-12 RX ADMIN — PHENYLEPHRINE HYDROCHLORIDE 200 MCG: 10 INJECTION INTRAVENOUS at 13:41

## 2023-10-12 RX ADMIN — DROPERIDOL 0.62 MG: 2.5 INJECTION, SOLUTION INTRAMUSCULAR; INTRAVENOUS at 16:31

## 2023-10-12 RX ADMIN — SODIUM CITRATE AND CITRIC ACID MONOHYDRATE 30 ML: 500; 334 SOLUTION ORAL at 13:07

## 2023-10-12 RX ADMIN — CLONIDINE HYDROCHLORIDE: 0.1 INJECTION, SOLUTION EPIDURAL at 13:50

## 2023-10-12 RX ADMIN — SODIUM CHLORIDE, POTASSIUM CHLORIDE, SODIUM LACTATE AND CALCIUM CHLORIDE 125 ML/HR: 600; 310; 30; 20 INJECTION, SOLUTION INTRAVENOUS at 09:40

## 2023-10-12 RX ADMIN — SODIUM CHLORIDE, POTASSIUM CHLORIDE, SODIUM LACTATE AND CALCIUM CHLORIDE 1000 ML: 600; 310; 30; 20 INJECTION, SOLUTION INTRAVENOUS at 12:48

## 2023-10-12 RX ADMIN — PHENYLEPHRINE HYDROCHLORIDE 200 MCG: 10 INJECTION INTRAVENOUS at 13:31

## 2023-10-12 NOTE — ANESTHESIA PROCEDURE NOTES
Spinal Block      Start Time: 10/12/2023 1:15 PM  Stop Time: 10/12/2023 1:18 PM  Performed By  Anesthesiologist: Ricco Salomon MD  Preanesthetic Checklist  Completed: patient identified, IV checked, site marked, risks and benefits discussed, surgical consent, monitors and equipment checked, pre-op evaluation and timeout performed  Spinal Block Prep:  Patient Position:sitting  Sterile Tech:cap, gloves and sterile barriers  Prep:Chloraprep  Patient Monitoring:EKG, continuous pulse oximetry and blood pressure monitoring    Spinal Block Procedure  Approach:midline  Guidance:landmark technique and palpation technique  Location:L4-L5  Needle Type:Sprotte  Needle Gauge:24 G  Placement of Spinal needle event:cerebrospinal fluid aspirated  Paresthesia: no  Fluid Appearance:clear  Medications: bupivacaine PF (MARCAINE) 0.75 % injection - Spinal   2 mL - 10/12/2023 1:18:00 PM  Morphine sulfate (PF) injection - Spinal   200 mcg - 10/12/2023 1:18:00 PM   Post Assessment  Patient Tolerance:patient tolerated the procedure well with no apparent complications  Complications no

## 2023-10-12 NOTE — PROGRESS NOTES
"[facility]: Aminoglycoside Dosing Consult  Pharmacy has been consulted for gentamicin dosing for  surgical prophylaxis  .   Pharmacy dosing per Evan Ramírez MD's request.   Dosing method: extended-interval  Goal peak:  N/A    Goal trough:  N/A      Relevant clinical data and objective history reviewed:  Berna Knight 28 y.o. female 170.2 cm (67\")       Ideal body weight: 61.6 kg (135 lb 12.9 oz)  Adjusted ideal body weight: 83.2 kg (183 lb 7.7 oz)     Estimated Creatinine Clearance: 144.4 mL/min (by C-G formula based on SCr of 0.75 mg/dL).  Lab Results   Component Value Date    WBC 10.14 10/12/2023     Temp Readings from Last 3 Encounters:   10/01/23 98.2 °F (36.8 °C) (Oral)   10/09/17 98.2 °F (36.8 °C) (Oral)   07/17/17 98.4 °F (36.9 °C) (Oral)     Baseline culture/source/susceptibility: N/A    Assessment/Plan    1) Ordered a single one-time dose gentamicin 420 mg IV x1 dose (5 mg/kg based on adjusted body weight 83.4 kg) per consult request.     2) No gentamicin levels will be ordered due to consult duration of 1 dose.     2) Will monitor serum creatinine tomorrow with AM labs.      Carlos Eduardo Hassan, PharmD, SHAWN, BCPS, BCCCP  10/12/23 11:29 EDT      "

## 2023-10-12 NOTE — PLAN OF CARE
Problem: Adult Inpatient Plan of Care  Goal: Plan of Care Review  Outcome: Ongoing, Progressing  Flowsheets (Taken 10/12/2023 1558)  Progress: improving  Plan of Care Reviewed With:   patient   spouse  Outcome Evaluation: Pt is stable statusa post repeat . Mom and baby are bonding well. Spouse is attentative to pt at bedside. Pt VSS. Pt uterus is firm and midline. Pt not actively bleeding. Pt denies any pain at this time. Pt is breastfeeding. RN will transfer pt to postpartum at the end of recovery period.  Goal: Patient-Specific Goal (Individualized)  Outcome: Ongoing, Progressing  Goal: Absence of Hospital-Acquired Illness or Injury  Outcome: Ongoing, Progressing  Intervention: Identify and Manage Fall Risk  Recent Flowsheet Documentation  Taken 10/12/2023 1430 by Moses Palacios RN  Safety Promotion/Fall Prevention: safety round/check completed  Taken 10/12/2023 1130 by Moses Palacios RN  Safety Promotion/Fall Prevention: safety round/check completed  Intervention: Prevent and Manage VTE (Venous Thromboembolism) Risk  Recent Flowsheet Documentation  Taken 10/12/2023 1500 by Moses Palacios RN  VTE Prevention/Management:   bilateral   sequential compression devices on  Taken 10/12/2023 1445 by Moses Palacios RN  VTE Prevention/Management:   bilateral   sequential compression devices on  Taken 10/12/2023 1430 by Moses Palacios RN  VTE Prevention/Management:   bilateral   sequential compression devices on  Taken 10/12/2023 1415 by Moses Palacios RN  VTE Prevention/Management:   sequential compression devices on   bilateral  Goal: Optimal Comfort and Wellbeing  Outcome: Ongoing, Progressing  Intervention: Provide Person-Centered Care  Recent Flowsheet Documentation  Taken 10/12/2023 1430 by Moses Palacios RN  Trust Relationship/Rapport:   care explained   choices provided   emotional support provided   empathic listening provided   questions answered   questions encouraged   reassurance provided   thoughts/feelings  acknowledged  Taken 10/12/2023 1130 by Moses Palacios RN  Trust Relationship/Rapport:   care explained   choices provided   emotional support provided   empathic listening provided   questions answered   questions encouraged   reassurance provided   thoughts/feelings acknowledged  Goal: Readiness for Transition of Care  Outcome: Ongoing, Progressing     Problem: Bleeding ( Delivery)  Goal: Bleeding is Controlled  Outcome: Ongoing, Progressing     Problem: Change in Fetal Wellbeing ( Delivery)  Goal: Stable Fetal Wellbeing  Outcome: Ongoing, Progressing     Problem: Infection ( Delivery)  Goal: Absence of Infection Signs and Symptoms  Outcome: Ongoing, Progressing     Problem: Respiratory Compromise ( Delivery)  Goal: Effective Oxygenation and Ventilation  Outcome: Ongoing, Progressing     Problem: Device-Related Complication Risk (Anesthesia/Analgesia, Neuraxial)  Goal: Safe Infusion Delivery Completion  Outcome: Ongoing, Progressing     Problem: Infection (Anesthesia/Analgesia, Neuraxial)  Goal: Absence of Infection Signs and Symptoms  Outcome: Ongoing, Progressing     Problem: Nausea and Vomiting (Anesthesia/Analgesia, Neuraxial)  Goal: Nausea and Vomiting Relief  Outcome: Ongoing, Progressing     Problem: Pain (Anesthesia/Analgesia, Neuraxial)  Goal: Effective Pain Control  Outcome: Ongoing, Progressing     Problem: Respiratory Compromise (Anesthesia/Analgesia, Neuraxial)  Goal: Effective Oxygenation and Ventilation  Outcome: Ongoing, Progressing     Problem: Sensorimotor Impairment (Anesthesia/Analgesia, Neuraxial)  Goal: Baseline Motor Function  Outcome: Ongoing, Progressing  Intervention: Optimize Sensorimotor Function  Recent Flowsheet Documentation  Taken 10/12/2023 1430 by Moses Palacios, RN  Safety Promotion/Fall Prevention: safety round/check completed  Taken 10/12/2023 1130 by Moses Paalcios, RN  Safety Promotion/Fall Prevention: safety round/check completed     Problem:  Urinary Retention (Anesthesia/Analgesia, Neuraxial)  Goal: Effective Urinary Elimination  Outcome: Ongoing, Progressing     Problem:  Fall Injury Risk  Goal: Absence of Fall, Infant Drop and Related Injury  Outcome: Ongoing, Progressing  Intervention: Promote Injury-Free Environment  Recent Flowsheet Documentation  Taken 10/12/2023 1430 by Moses Palacios RN  Safety Promotion/Fall Prevention: safety round/check completed  Taken 10/12/2023 1130 by Moses Palacios RN  Safety Promotion/Fall Prevention: safety round/check completed     Problem: Skin Injury Risk Increased  Goal: Skin Health and Integrity  Outcome: Ongoing, Progressing   Goal Outcome Evaluation:  Plan of Care Reviewed With: patient, spouse        Progress: improving  Outcome Evaluation: Pt is stable statusa post repeat . Mom and baby are bonding well. Spouse is attentative to pt at bedside. Pt VSS. Pt uterus is firm and midline. Pt not actively bleeding. Pt denies any pain at this time. Pt is breastfeeding. RN will transfer pt to postpartum at the end of recovery period.

## 2023-10-12 NOTE — ANESTHESIA PREPROCEDURE EVALUATION
" Anesthesia Evaluation     Patient summary reviewed and Nursing notes reviewed   NPO Solid Status: > 8 hours  NPO Liquid Status: > 2 hours           Airway   Mallampati: II  TM distance: >3 FB  Neck ROM: full  No difficulty expected  Dental      Pulmonary    (+) asthma,  Cardiovascular     (+) hypertension      Neuro/Psych  GI/Hepatic/Renal/Endo    (+) obesity    Musculoskeletal     Abdominal    Substance History      OB/GYN    (+) Pregnant        Other                          Anesthesia Plan    ASA 2     (I have reviewed the patient's history with the patient and the chart, including all pertinent laboratory results and imaging. I have explained the risks of spinal anesthesia including but not limited to hypotension, PDPH, nerve injury and risk of converting to GA. Patient understands risks and agrees to proceed.      Ht 170.2 cm (67\")   BMI 39.94 kg/mý   )  intravenous induction     Anesthetic plan, risks, benefits, and alternatives have been provided, discussed and informed consent has been obtained with: patient.        CODE STATUS:    Level Of Support Discussed With: Patient  Code Status (Patient has no pulse and is not breathing): CPR (Attempt to Resuscitate)  Medical Interventions (Patient has pulse or is breathing): Full Support      "

## 2023-10-12 NOTE — OP NOTE
"[facility]   Section Operative Note    Patient Name: Berna Knight  :  1995  MRN:  [id number]      Date of procedure:  10/12/2023        Pre-Operative Dx:   1.  IUP at 37w6d weeks   2.   Pregnancy-induced hypertension, third trimester   3.   History of  section complicating pregnancy            Postoperative Dx:  Same        Procedure: Repeat Low Transverse   via Pfannensteil      Surgeon: Evan Ramírez MD      Assistant: Umu ROQUE     Anesthesia: Spinal      EBL:    Quantitative EBL:  700 ml     Quantitative Blood Loss (mL): 625 mL         Specimens: A: cord ph  B: cord blood  C: placenta to path     Findings:                           Amniotic Fluid clear   Placenta Intact, 3 VC  Uterus normal  Tubes and ovaries normal bilaterally              Infant:           Gender: Viable male  infant \" Sreedhar\"     Weight: 3090 g (6 lb 13 oz)     Apgars: 7  @ 1 minute /     9  @ 5 minutes                 Indication for C/Section:     Prior C/S               Procedure Details:  The patient was taken to the operating room where spinal  anesthesia was found to be adequate. She was prepped and draped in the usual sterile manner in the dorsal supine position with leftward tilt. A Pfannenstiel incision was made and carried down to the fascia. The fascia was incised in the mid-line and extended transversely with Pat scissors. The fascia was grasped and elevated and  from the underlying rectus muscles superiorly and inferiorly. The peritoneum was identified and entered. Peritoneal incision was extended superiorly and inferiorly with good visualization of the bladder. The bladder blade was inserted and the vesicouterine peritoneum was incised transversely and the bladder flap was created digitally. The bladder blade was reinserted. The  lower uterine segment was incised in a transverse fashion with the scalpel.  The vertex was elevated and delivered through the incision " "but difficult to deliver through the skin due to maternal habitus. A vacuum was placed to assist delivery of the vertex with 1 pull. . The remainder of the infant was delivered without difficulty. The umbilical cord was clamped  & cut after a 30 second delay. The infant was handed off the field. Cord ph and cord bloods were obtained. The placenta was removed intact and appeared normal. The uterus was exteriorized for better visualization. The uterine incision was closed with running locked sutures of 0 Monocryl. Excellent hemostasis was noted with single layer closure.  The abdominal cavity was irrigated and cleared of all clot and debris. The uterus was returned to the abdomen. The uterine incision was inspected and noted to be hemostatic. Rectus muscles were reapproximated in the midline with 0 Vicryl.  The fascia was closed with 0 PDS in running continuous fashion. The subcutaneous tissue was closed with 3-0 Vicryl. The skin was closed in subcuticular fashion with 4-0 Vicryl. The \"OB cocktail of local was injected into each layer as they were closed to assist with post op pain relief.       Instrument, sponge, and needle counts were correct prior the abdominal closure and at the conclusion of the case. Mother and baby  to recovery room in stable condition.              Complications:     None          Antibiotics: clindamycin (Cleocin) and gentamycin (Garamycin)                      Evan Ramírez MD  10/12/2023  14:48 EDT      "

## 2023-10-13 LAB
BASOPHILS # BLD AUTO: 0.04 10*3/MM3 (ref 0–0.2)
BASOPHILS NFR BLD AUTO: 0.3 % (ref 0–1.5)
DEPRECATED RDW RBC AUTO: 40.1 FL (ref 37–54)
EOSINOPHIL # BLD AUTO: 0.12 10*3/MM3 (ref 0–0.4)
EOSINOPHIL NFR BLD AUTO: 0.9 % (ref 0.3–6.2)
ERYTHROCYTE [DISTWIDTH] IN BLOOD BY AUTOMATED COUNT: 13.2 % (ref 12.3–15.4)
HCT VFR BLD AUTO: 33.8 % (ref 34–46.6)
HGB BLD-MCNC: 11.4 G/DL (ref 12–15.9)
IMM GRANULOCYTES # BLD AUTO: 0.05 10*3/MM3 (ref 0–0.05)
IMM GRANULOCYTES NFR BLD AUTO: 0.4 % (ref 0–0.5)
LYMPHOCYTES # BLD AUTO: 3.17 10*3/MM3 (ref 0.7–3.1)
LYMPHOCYTES NFR BLD AUTO: 23.6 % (ref 19.6–45.3)
MCH RBC QN AUTO: 28.1 PG (ref 26.6–33)
MCHC RBC AUTO-ENTMCNC: 33.7 G/DL (ref 31.5–35.7)
MCV RBC AUTO: 83.3 FL (ref 79–97)
MONOCYTES # BLD AUTO: 1.07 10*3/MM3 (ref 0.1–0.9)
MONOCYTES NFR BLD AUTO: 7.9 % (ref 5–12)
NEUTROPHILS NFR BLD AUTO: 66.9 % (ref 42.7–76)
NEUTROPHILS NFR BLD AUTO: 9.01 10*3/MM3 (ref 1.7–7)
NRBC BLD AUTO-RTO: 0 /100 WBC (ref 0–0.2)
PLATELET # BLD AUTO: 243 10*3/MM3 (ref 140–450)
PMV BLD AUTO: 12.1 FL (ref 6–12)
RBC # BLD AUTO: 4.06 10*6/MM3 (ref 3.77–5.28)
WBC NRBC COR # BLD: 13.46 10*3/MM3 (ref 3.4–10.8)

## 2023-10-13 PROCEDURE — 25010000002 KETOROLAC TROMETHAMINE PER 15 MG: Performed by: OBSTETRICS & GYNECOLOGY

## 2023-10-13 PROCEDURE — 25010000002 ENOXAPARIN PER 10 MG: Performed by: OBSTETRICS & GYNECOLOGY

## 2023-10-13 PROCEDURE — 85025 COMPLETE CBC W/AUTO DIFF WBC: CPT | Performed by: OBSTETRICS & GYNECOLOGY

## 2023-10-13 RX ADMIN — Medication 1 TABLET: at 08:16

## 2023-10-13 RX ADMIN — ACETAMINOPHEN 1000 MG: 500 TABLET ORAL at 02:35

## 2023-10-13 RX ADMIN — KETOROLAC TROMETHAMINE 15 MG: 15 INJECTION, SOLUTION INTRAMUSCULAR; INTRAVENOUS at 04:50

## 2023-10-13 RX ADMIN — KETOROLAC TROMETHAMINE 15 MG: 15 INJECTION, SOLUTION INTRAMUSCULAR; INTRAVENOUS at 11:08

## 2023-10-13 RX ADMIN — ENOXAPARIN SODIUM 40 MG: 100 INJECTION SUBCUTANEOUS at 14:33

## 2023-10-13 RX ADMIN — ACETAMINOPHEN 1000 MG: 500 TABLET ORAL at 14:33

## 2023-10-13 RX ADMIN — DOCUSATE SODIUM 100 MG: 100 CAPSULE, LIQUID FILLED ORAL at 08:16

## 2023-10-13 RX ADMIN — KETOROLAC TROMETHAMINE 15 MG: 15 INJECTION, SOLUTION INTRAMUSCULAR; INTRAVENOUS at 17:37

## 2023-10-13 RX ADMIN — DOCUSATE SODIUM 100 MG: 100 CAPSULE, LIQUID FILLED ORAL at 20:14

## 2023-10-13 RX ADMIN — ACETAMINOPHEN 650 MG: 325 TABLET, FILM COATED ORAL at 21:40

## 2023-10-13 RX ADMIN — ACETAMINOPHEN 1000 MG: 500 TABLET ORAL at 08:16

## 2023-10-13 NOTE — LACTATION NOTE
This note was copied from a baby's chart.  P2, T baby. Mom is 1-st time BF. Informed PT that LC is available to help with BF until 2300. Offer assistance, but mom declined, said she will call with next feeding if needing help. Reports baby BF well in L&D an here. Encouraged her to try BF baby every 2-3 h. or PRN. Educated on the importance of stimulation for adequate milk supply. Instructions on how to wake up infant for feeding also given. Mom already ordered PBP. She denies any questions. Encouraged to call if needing help.

## 2023-10-13 NOTE — PLAN OF CARE
Problem: Adult Inpatient Plan of Care  Goal: Plan of Care Review  Outcome: Ongoing, Progressing  Flowsheets  Taken 10/13/2023 0515 by Nida Westbrook RN  Plan of Care Reviewed With: patient  Taken 10/12/2023 1558 by Moses Palacios RN  Progress: improving  Goal: Patient-Specific Goal (Individualized)  Outcome: Ongoing, Progressing  Goal: Absence of Hospital-Acquired Illness or Injury  Outcome: Ongoing, Progressing  Intervention: Identify and Manage Fall Risk  Recent Flowsheet Documentation  Taken 10/13/2023 0450 by Nida Westbrook RN  Safety Promotion/Fall Prevention: safety round/check completed  Taken 10/13/2023 0235 by Nida Westbrook RN  Safety Promotion/Fall Prevention: safety round/check completed  Taken 10/13/2023 0041 by Nida Westbrook RN  Safety Promotion/Fall Prevention: safety round/check completed  Taken 10/12/2023 2204 by Nida Westbrook RN  Safety Promotion/Fall Prevention: safety round/check completed  Taken 10/12/2023 2058 by Nida Westbrook RN  Safety Promotion/Fall Prevention: safety round/check completed  Taken 10/12/2023 2008 by Nida Westbrook RN  Safety Promotion/Fall Prevention: safety round/check completed  Intervention: Prevent Skin Injury  Recent Flowsheet Documentation  Taken 10/12/2023 2008 by Nida Westbrook RN  Body Position: supine  Intervention: Prevent and Manage VTE (Venous Thromboembolism) Risk  Recent Flowsheet Documentation  Taken 10/13/2023 0450 by Nida Westbrook RN  Activity Management: activity encouraged  Taken 10/13/2023 0235 by Nida Westbrook RN  Activity Management:   bedrest   activity encouraged  Taken 10/13/2023 0041 by Nida Westbrook RN  Activity Management:   bedrest   activity encouraged  Taken 10/12/2023 2204 by Nida Westbrook RN  Activity Management: bedrest  Taken 10/12/2023 2058 by Nida Westbrook RN  Activity Management: bedrest  Taken 10/12/2023 2008 by Nida Westbrook RN  Activity Management: bedrest  VTE Prevention/Management: sequential compression  devices on  Range of Motion: active ROM (range of motion) encouraged  Goal: Optimal Comfort and Wellbeing  Outcome: Ongoing, Progressing  Intervention: Provide Person-Centered Care  Recent Flowsheet Documentation  Taken 10/12/2023 2008 by Nida Westbrook RN  Trust Relationship/Rapport:   care explained   choices provided   questions answered   questions encouraged   thoughts/feelings acknowledged  Goal: Readiness for Transition of Care  Outcome: Ongoing, Progressing     Problem: Bleeding ( Delivery)  Goal: Bleeding is Controlled  Outcome: Ongoing, Progressing     Problem: Change in Fetal Wellbeing ( Delivery)  Goal: Stable Fetal Wellbeing  Outcome: Ongoing, Progressing  Intervention: Promote and Monitor Fetal Wellbeing  Recent Flowsheet Documentation  Taken 10/12/2023 2008 by Nida Westbrook RN  Body Position: supine     Problem: Infection ( Delivery)  Goal: Absence of Infection Signs and Symptoms  Outcome: Ongoing, Progressing     Problem: Respiratory Compromise ( Delivery)  Goal: Effective Oxygenation and Ventilation  Outcome: Ongoing, Progressing     Problem: Device-Related Complication Risk (Anesthesia/Analgesia, Neuraxial)  Goal: Safe Infusion Delivery Completion  Outcome: Ongoing, Progressing     Problem: Infection (Anesthesia/Analgesia, Neuraxial)  Goal: Absence of Infection Signs and Symptoms  Outcome: Ongoing, Progressing     Problem: Nausea and Vomiting (Anesthesia/Analgesia, Neuraxial)  Goal: Nausea and Vomiting Relief  Outcome: Ongoing, Progressing     Problem: Pain (Anesthesia/Analgesia, Neuraxial)  Goal: Effective Pain Control  Outcome: Ongoing, Progressing     Problem: Respiratory Compromise (Anesthesia/Analgesia, Neuraxial)  Goal: Effective Oxygenation and Ventilation  Outcome: Ongoing, Progressing  Intervention: Optimize Oxygenation and Ventilation  Recent Flowsheet Documentation  Taken 10/12/2023 2008 by Nida Westbrook RN  Head of Bed (HOB) Positioning: HOB at 30-45  degrees     Problem: Sensorimotor Impairment (Anesthesia/Analgesia, Neuraxial)  Goal: Baseline Motor Function  Outcome: Ongoing, Progressing  Intervention: Optimize Sensorimotor Function  Recent Flowsheet Documentation  Taken 10/13/2023 0450 by Nida Westbrook RN  Safety Promotion/Fall Prevention: safety round/check completed  Taken 10/13/2023 0235 by Nida Westbrook RN  Safety Promotion/Fall Prevention: safety round/check completed  Taken 10/13/2023 0041 by Nida Westbrook RN  Safety Promotion/Fall Prevention: safety round/check completed  Taken 10/12/2023 2204 by Nida Westbrook RN  Safety Promotion/Fall Prevention: safety round/check completed  Taken 10/12/2023 2058 by Nida Westbrook RN  Safety Promotion/Fall Prevention: safety round/check completed  Taken 10/12/2023 2008 by Nida Westbrook RN  Safety Promotion/Fall Prevention: safety round/check completed     Problem: Urinary Retention (Anesthesia/Analgesia, Neuraxial)  Goal: Effective Urinary Elimination  Outcome: Ongoing, Progressing  Intervention: Promote Effective Urine Elimination  Recent Flowsheet Documentation  Taken 10/12/2023 2008 by Nida Westbrook RN  Urinary Elimination Promotion: catheter patency maintained     Problem:  Fall Injury Risk  Goal: Absence of Fall, Infant Drop and Related Injury  Outcome: Ongoing, Progressing  Intervention: Promote Injury-Free Environment  Recent Flowsheet Documentation  Taken 10/13/2023 0450 by Nida Westbrook RN  Safety Promotion/Fall Prevention: safety round/check completed  Taken 10/13/2023 0235 by Nida Westbrook RN  Safety Promotion/Fall Prevention: safety round/check completed  Taken 10/13/2023 0041 by Nida Westbrook RN  Safety Promotion/Fall Prevention: safety round/check completed  Taken 10/12/2023 2204 by Nida Westbrook RN  Safety Promotion/Fall Prevention: safety round/check completed  Taken 10/12/2023 2058 by Nida Westbrook RN  Safety Promotion/Fall Prevention: safety round/check  completed  Taken 10/12/2023 2008 by Nida Westbrook RN  Safety Promotion/Fall Prevention: safety round/check completed     Problem: Skin Injury Risk Increased  Goal: Skin Health and Integrity  Outcome: Ongoing, Progressing  Intervention: Optimize Skin Protection  Recent Flowsheet Documentation  Taken 10/12/2023 2008 by Nida Westbrook, RN  Head of Bed (HOB) Positioning: HOB at 30-45 degrees   Goal Outcome Evaluation:  Plan of Care Reviewed With: patient   Pt doing well. Pain well controlled with Tylenol and Toradol, VSS within parameters, [facility] d/c'd @0450, dsg cdi(clean dry and intact). N/V has subsided from previous shift. Will contunue to monitor until eos.

## 2023-10-13 NOTE — ANESTHESIA POSTPROCEDURE EVALUATION
"Patient: Berna Knight    Procedure Summary       Date: 10/12/23 Room / Location:  [facility]   [facility]    Anesthesia Start: 1314 Anesthesia Stop:     Procedure:  SECTION REPEAT (Abdomen) Diagnosis:     Surgeons: Evan Ramírez MD Provider: Ricco Salomon MD    Anesthesia Type: Not recorded ASA Status: 2            Anesthesia Type: No value filed.    Vitals  Vitals Value Taken Time   /86 10/12/23 1840   Temp 36.7 øC (98 øF) 10/12/23 1840   Pulse 60 10/12/23 1840   Resp 16 10/12/23 1840   SpO2 98 % 10/12/23 1840           Post Anesthesia Care and Evaluation    Patient location during evaluation: bedside  Patient participation: complete - patient participated  Level of consciousness: awake and alert  Pain management: adequate    Airway patency: patent  Anesthetic complications: No anesthetic complications  PONV Status: NA  Cardiovascular status: acceptable and hemodynamically stable  Respiratory status: acceptable  Hydration status: acceptable  Post Neuraxial Block status: No signs or symptoms of PDPH  Comments: /86 (BP Location: Right arm, Patient Position: Lying)   Pulse 60   Temp 36.7 øC (98 øF) (Oral)   Resp 16   Ht 170.2 cm (67\")   SpO2 98%   Breastfeeding Yes   BMI 39.94 kg/mý         "

## 2023-10-13 NOTE — PROGRESS NOTES
[facility]  Vaginal Delivery Progress Note    Patient Name: Berna Knight  :  1995  MRN:  [id number]      Subjective   Postpartum Day 1: Vaginal Delivery of a male infant.     The patient feels well without complaints.  Her pain is well controlled.  Reports normal lochia.     The patient plans to breastfeed.    Objective     Vital Signs Range for the last 24 hours  Temperature: Temp:  [97.2 °F (36.2 °C)-98.6 °F (37 °C)] 98.3 °F (36.8 °C)       BP: BP: ()/(46-89) 113/76   Pulse: Heart Rate:  [56-78] 56   Respirations: Resp:  [14-19] 17                       Physical Exam:  General: Awake and alert  Abdomen: Fundus: firm, non tender, below umbilicus  Extremities:  trace edema, NT     Labs:     Results from last 7 days   Lab Units 10/13/23  0653 10/12/23  0942   WBC 10*3/mm3 13.46* 10.14   HEMOGLOBIN g/dL 11.4* 12.3   HEMATOCRIT % 33.8* 36.9   PLATELETS 10*3/mm3 243 312       Prenatal labs results reviewed:  Yes   Rubella:  immune  Rh Status:    RH type   Date Value Ref Range Status   10/12/2023 Positive  Final         Assessment & Plan  :     1. PPD1 S/P  - Doing well, continue usual care.     2. GHTN - BP normal postpartum, no s/s preE    3. Male infant well at bedside, desire circumcision today    Anticipate d/c home tomorrow          Pregnancy-induced hypertension, third trimester    Pregnancy    History of  section complicating pregnancy          Jazzy Moura MD  10/13/2023  11:40 EDT

## 2023-10-14 PROCEDURE — 25010000002 ENOXAPARIN PER 10 MG: Performed by: OBSTETRICS & GYNECOLOGY

## 2023-10-14 RX ADMIN — IBUPROFEN 600 MG: 600 TABLET, FILM COATED ORAL at 12:13

## 2023-10-14 RX ADMIN — ACETAMINOPHEN 650 MG: 325 TABLET, FILM COATED ORAL at 15:40

## 2023-10-14 RX ADMIN — IBUPROFEN 600 MG: 600 TABLET, FILM COATED ORAL at 18:23

## 2023-10-14 RX ADMIN — SIMETHICONE CHEW TAB 80 MG 80 MG: 80 TABLET ORAL at 21:14

## 2023-10-14 RX ADMIN — ENOXAPARIN SODIUM 40 MG: 100 INJECTION SUBCUTANEOUS at 13:41

## 2023-10-14 RX ADMIN — IBUPROFEN 600 MG: 600 TABLET, FILM COATED ORAL at 00:31

## 2023-10-14 RX ADMIN — ENOXAPARIN SODIUM 40 MG: 100 INJECTION SUBCUTANEOUS at 02:51

## 2023-10-14 RX ADMIN — ACETAMINOPHEN 650 MG: 325 TABLET, FILM COATED ORAL at 03:51

## 2023-10-14 RX ADMIN — Medication 1 TABLET: at 09:17

## 2023-10-14 RX ADMIN — IBUPROFEN 600 MG: 600 TABLET, FILM COATED ORAL at 06:12

## 2023-10-14 RX ADMIN — ACETAMINOPHEN 650 MG: 325 TABLET, FILM COATED ORAL at 09:18

## 2023-10-14 RX ADMIN — ACETAMINOPHEN 650 MG: 325 TABLET, FILM COATED ORAL at 21:11

## 2023-10-14 RX ADMIN — DOCUSATE SODIUM 100 MG: 100 CAPSULE, LIQUID FILLED ORAL at 21:11

## 2023-10-14 NOTE — DISCHARGE SUMMARY
Date of Discharge:  10/14/2023    Discharge Diagnosis:   Pt with repeat ltcd and might want to go home.  She had a male who was circumcised.  Her bp's have been normal. She does not want narcotics.  Will await and see how baby does to determine whether she goes home.  She is pod 2.   Procedures Performed  Procedure(s):   SECTION REPEAT       Consults:   Consults       No orders found from 2023 to 10/13/2023.            Condition on Discharge:   Subjective   Postpartum Day 2 csection    The patient feels well without complaints.    Vital Signs  Temp:  [98.1 °F (36.7 °C)-98.9 °F (37.2 °C)] 98.1 °F (36.7 °C)  Heart Rate:  [56-91] 67  Resp:  [16-20] 20  BP: (112-132)/(71-87) 119/79    Physical Exam:   General: Awake and alert   Abdomen: Fundus: firm, non tender    Extremities:  Calves NT bilaterally    Assessment & Plan     P0d2... GHTN.  No bp meds required  Stable for discharge. Instructions reviewed      Discharge Disposition  Home or Self Care    Discharge Medications     Discharge Medications        Continue These Medications        Instructions Start Date   prenatal (CLASSIC) vitamin 28-0.8 MG tablet tablet  Generic drug: prenatal vitamin   Oral, Daily             ASK your doctor about these medications        Instructions Start Date   levonorgestrel 20 MCG/24HR IUD  Commonly known as: MIRENA   1 each, Intrauterine      terbinafine 1 % cream  Commonly known as: LamISIL AT   Topical, 2 Times Daily                 The patient has been prescribed a controlled substance.  She has been counseled on the risks associated with using the medication.   The addictive potential of this medication and alternatives were discussed carefully with this patient and she demonstrated understanding.  A [facility] report has been obtained and reviewed.       Activity at Discharge: restrictions reviewed    Follow-up Appointments  No future appointments.      Test Results Pending at Discharge       Norma Steele  MD  10/14/23  07:14 EDT

## 2023-10-14 NOTE — PLAN OF CARE
Goal Outcome Evaluation:  Plan of Care Reviewed With: patient        Progress: improving  Outcome Evaluation: VSS. Pt up ad rox and voiding without difficulty.  Fundal assessment and lochia, wnl.  Incision, c/d/i.  Pain controlled w/scheduled tylenol and ibuprofen.

## 2023-10-15 VITALS
BODY MASS INDEX: 39.94 KG/M2 | SYSTOLIC BLOOD PRESSURE: 148 MMHG | TEMPERATURE: 98 F | OXYGEN SATURATION: 96 % | HEIGHT: 67 IN | RESPIRATION RATE: 16 BRPM | HEART RATE: 76 BPM | DIASTOLIC BLOOD PRESSURE: 93 MMHG

## 2023-10-15 PROCEDURE — 25010000002 ENOXAPARIN PER 10 MG: Performed by: OBSTETRICS & GYNECOLOGY

## 2023-10-15 RX ADMIN — IBUPROFEN 600 MG: 600 TABLET, FILM COATED ORAL at 00:21

## 2023-10-15 RX ADMIN — ACETAMINOPHEN 650 MG: 325 TABLET, FILM COATED ORAL at 09:10

## 2023-10-15 RX ADMIN — ACETAMINOPHEN 650 MG: 325 TABLET, FILM COATED ORAL at 02:46

## 2023-10-15 RX ADMIN — ENOXAPARIN SODIUM 40 MG: 100 INJECTION SUBCUTANEOUS at 02:46

## 2023-10-15 RX ADMIN — Medication 1 TABLET: at 09:10

## 2023-10-15 RX ADMIN — DOCUSATE SODIUM 100 MG: 100 CAPSULE, LIQUID FILLED ORAL at 09:10

## 2023-10-15 RX ADMIN — IBUPROFEN 600 MG: 600 TABLET, FILM COATED ORAL at 06:18

## 2023-10-15 NOTE — DISCHARGE SUMMARY
Patient Name: Berna Knight  :  1995  MRN:  [id number]    Date of Discharge:  10/15/2023    Discharge Diagnosis:   Pregnancy-induced hypertension, third trimester    Pregnancy    History of  section complicating pregnancy      Presenting Problem/History of Present Illness  Pregnancy [Z34.90]       Hospital Course  Patient is a 28 y.o. female presented with GHTN at term, 37w6d, for delivery via repeat LTCS. She underwent  without complication. Her post op course was uncomplicated. BP was mostly  normal with occasional intermittent mild range BPs. No s/s preeclampsia during admission. She was meeting criteria for discharge on POD 3 and was sent home with routine instructions and precautions discussed.     Procedures Performed  Procedure(s):   SECTION REPEAT           Condition on Discharge:  Post-Op Day 3 S/P   Subjective     Patient reports:    Doing well - ready for discharge. Pain well controlled. Tolerating po and   having flatus. Voiding and ambulating without difficulty. Lochia normal.       Vital Signs  Temp:  [97.9 °F (36.6 °C)-98.3 °F (36.8 °C)] 98 °F (36.7 °C)  Heart Rate:  [70-80] 76  Resp:  [16-18] 16  BP: (133-148)/(86-93) 148/93    Physical Exam:     Gen: Alert and awake   Abdomen: Soft, ND,  Fundus firm with minimal tenderness   Incision : Intact, without erythema or exudate   Extremities: Calves NT bilaterally         Results from last 7 days   Lab Units 10/13/23  0653 10/12/23  0942   WBC 10*3/mm3 13.46* 10.14   HEMOGLOBIN g/dL 11.4* 12.3   HEMATOCRIT % 33.8* 36.9   PLATELETS 10*3/mm3 243 312             POD 3 s/p C/S-     Consults:   Consults       No orders found from 2023 to 10/13/2023.            Discharge Disposition  Home or Self Care    Discharge Medications     Discharge Medications        Continue These Medications        Instructions Start Date   prenatal (CLASSIC) vitamin 28-0.8 MG tablet tablet  Generic drug: prenatal vitamin   Oral,  Daily             Stop These Medications      levonorgestrel 20 MCG/24HR IUD  Commonly known as: MIRENA     terbinafine 1 % cream  Commonly known as: LamISIL AT              The patient has been prescribed a controlled substance.  She has been counseled on the risks associated with using the medication.   The addictive potential of this medication and alternatives were discussed carefully with this patient and she demonstrated understanding.  A [facility]OND report has been obtained and reviewed.         Activity at Discharge:   Activity Instructions       Discharge Activity      Follow up 2wks for telehealth and 6wks in office. Monitor bp , if over 150/100, call us.  Call us with heavy bleeding, temp over 101, depression, sevre pain. Pelvic rest for 6wks.            Follow-up Appointments  No future appointments.      Test Results Pending at Discharge       Jazzy Moura MD  10/15/23  10:06 EDT

## 2023-10-15 NOTE — PLAN OF CARE
Goal Outcome Evaluation:              Outcome Evaluation: VSS, assessment WDL, pain controlled with scheduled medications, breastfeeding and bonding with infant

## 2023-10-15 NOTE — LACTATION NOTE
Pt wanting baby to be checked for tongue, lip and cheek ties. Baby appears to have a little bit of a lip tie but can still flange lip. Baby can extend tongue past gum line. Encouraged to f/u with pediatrician about her concerns. Baby is latching well at this time and pt's milk is in. Educated on baby's expected output and weight gain. Pt has [facility] info if needing f/u    Lactation Consult Note    Evaluation Completed: 10/15/2023 10:55 EDT  Patient Name: Berna Knight  :  1995  MRN:  [id number]     REFERRAL  INFORMATION:                                         DELIVERY HISTORY:        Skin to skin initiation date/time: 10/12/2023  1:55 PM   Skin to skin end date/time:           MATERNAL ASSESSMENT:                               INFANT ASSESSMENT:  Information for the patient's :  Eugene TonyahuangAlhaji [[id number]]   No past medical history on file.                                                                                                   MATERNAL INFANT FEEDING:                                                                       EQUIPMENT TYPE:                                 BREAST PUMPING:          LACTATION REFERRALS:

## 2023-10-15 NOTE — PLAN OF CARE
Goal Outcome Evaluation:           Progress: improving  Outcome Evaluation: VSS, pain well controlled, ambulating well, breastfeeding welll, d/c today

## (undated) DEVICE — KIWI® VACUUM DELIVERY SYSTEM, OMNICUP® WITH TRACTION FORCE INDICATOR: Brand: KIWI® OMNICUP®

## (undated) DEVICE — Device: Brand: PORTEX

## (undated) DEVICE — SUT PDS 0 CTX 36IN DYED Z370T

## (undated) DEVICE — ANTIBACTERIAL UNDYED BRAIDED (POLYGLACTIN 910), SYNTHETIC ABSORBABLE SUTURE: Brand: COATED VICRYL

## (undated) DEVICE — SUT MNCRYL 0/0 CTX 36IN Y398H

## (undated) DEVICE — 3M(TM) TEGADERM(TM) TRANSPARENT FILM DRESSING FRAME STYLE 1627: Brand: 3M™ TEGADERM™

## (undated) DEVICE — NDL BLNT 18G 1 1/2IN

## (undated) DEVICE — SOL IRR NACL 0.9PCT BT 1000ML

## (undated) DEVICE — SUT VIC 4/0 KS 27IN VCP662H